# Patient Record
Sex: FEMALE | Race: BLACK OR AFRICAN AMERICAN | NOT HISPANIC OR LATINO | Employment: FULL TIME | URBAN - METROPOLITAN AREA
[De-identification: names, ages, dates, MRNs, and addresses within clinical notes are randomized per-mention and may not be internally consistent; named-entity substitution may affect disease eponyms.]

---

## 2019-07-12 PROBLEM — F32.A DEPRESSION: Status: ACTIVE | Noted: 2019-07-12

## 2019-07-12 PROBLEM — E66.01 MORBID OBESITY: Status: ACTIVE | Noted: 2019-07-12

## 2019-07-12 PROBLEM — E03.9 HYPOTHYROIDISM: Status: ACTIVE | Noted: 2019-07-12

## 2019-07-12 PROBLEM — E55.9 VITAMIN D DEFICIENCY: Status: ACTIVE | Noted: 2019-07-12

## 2019-12-03 PROBLEM — R44.0 AUDITORY HALLUCINATION: Status: ACTIVE | Noted: 2019-12-03

## 2019-12-03 PROBLEM — J06.9 UPPER RESPIRATORY INFECTION: Status: ACTIVE | Noted: 2019-12-03

## 2020-01-29 PROBLEM — R44.0 AUDITORY HALLUCINATION: Status: RESOLVED | Noted: 2019-12-03 | Resolved: 2020-01-29

## 2020-01-29 PROBLEM — F33.3 SEVERE EPISODE OF RECURRENT MAJOR DEPRESSIVE DISORDER, WITH PSYCHOTIC FEATURES: Status: ACTIVE | Noted: 2019-07-12

## 2021-03-08 PROBLEM — M79.2 NEUROPATHIC PAIN: Status: ACTIVE | Noted: 2018-03-15

## 2021-03-08 PROBLEM — F10.129 ALCOHOL ABUSE WITH INTOXICATION: Status: ACTIVE | Noted: 2021-03-08

## 2021-10-06 PROBLEM — R45.851 SUICIDAL IDEATION: Status: ACTIVE | Noted: 2021-10-06

## 2021-10-06 PROBLEM — U07.1 ASYMPTOMATIC COVID-19 VIRUS INFECTION: Status: ACTIVE | Noted: 2021-10-06

## 2021-10-06 PROBLEM — E66.9 OBESITY, CLASS II, BMI 35-39.9: Status: ACTIVE | Noted: 2021-10-06

## 2021-10-06 PROBLEM — F33.2 SEVERE EPISODE OF RECURRENT MAJOR DEPRESSIVE DISORDER, WITHOUT PSYCHOTIC FEATURES: Status: ACTIVE | Noted: 2019-07-12

## 2021-10-06 PROBLEM — E66.812 OBESITY, CLASS II, BMI 35-39.9: Status: ACTIVE | Noted: 2021-10-06

## 2022-07-02 PROBLEM — F41.9 ANXIETY: Status: ACTIVE | Noted: 2022-07-02

## 2023-06-30 PROBLEM — H04.123 DRY EYE SYNDROME, BILATERAL: Status: ACTIVE | Noted: 2023-06-30

## 2023-06-30 PROBLEM — H02.886 MEIBOMIAN GLAND DYSFUNCTION (MGD) OF BOTH EYES: Status: ACTIVE | Noted: 2023-06-30

## 2023-06-30 PROBLEM — H43.393 VITREOUS FLOATERS OF BOTH EYES: Status: ACTIVE | Noted: 2023-06-30

## 2023-06-30 PROBLEM — E11.9 DIABETES MELLITUS WITHOUT COMPLICATION: Status: ACTIVE | Noted: 2023-06-30

## 2023-06-30 PROBLEM — H40.013 OAG (OPEN ANGLE GLAUCOMA) SUSPECT, LOW RISK, BILATERAL: Status: ACTIVE | Noted: 2023-06-30

## 2023-06-30 PROBLEM — H02.883 MEIBOMIAN GLAND DYSFUNCTION (MGD) OF BOTH EYES: Status: ACTIVE | Noted: 2023-06-30

## 2023-06-30 PROBLEM — H25.813 COMBINED FORMS OF AGE-RELATED CATARACT OF BOTH EYES: Status: ACTIVE | Noted: 2023-06-30

## 2023-06-30 PROBLEM — H53.10 SUBJECTIVE VISUAL DISTURBANCE OF BOTH EYES: Status: ACTIVE | Noted: 2023-06-30

## 2023-08-25 PROBLEM — R44.0 AUDITORY HALLUCINATION: Status: ACTIVE | Noted: 2018-03-15

## 2024-05-01 PROBLEM — U07.1 ASYMPTOMATIC COVID-19 VIRUS INFECTION: Status: RESOLVED | Noted: 2021-10-06 | Resolved: 2024-05-01

## 2024-05-01 PROBLEM — J06.9 UPPER RESPIRATORY INFECTION: Status: RESOLVED | Noted: 2019-12-03 | Resolved: 2024-05-01

## 2024-05-01 LAB — PAP RECOMMENDATION EXT: NORMAL

## 2024-06-07 ENCOUNTER — PATIENT OUTREACH (OUTPATIENT)
Dept: ADMINISTRATIVE | Facility: HOSPITAL | Age: 44
End: 2024-06-07

## 2024-06-25 PROBLEM — H40.023 OAG (OPEN ANGLE GLAUCOMA) SUSPECT, HIGH RISK, BILATERAL: Status: ACTIVE | Noted: 2024-06-25

## 2024-07-02 PROBLEM — Z90.79 H/O UNILATERAL SALPINGECTOMY: Status: ACTIVE | Noted: 2024-07-02

## 2024-10-10 ENCOUNTER — ON-DEMAND VIRTUAL (OUTPATIENT)
Dept: URGENT CARE | Facility: CLINIC | Age: 44
End: 2024-10-10

## 2024-10-10 DIAGNOSIS — R45.851 SUICIDAL IDEATION: Primary | ICD-10-CM

## 2024-10-10 NOTE — PROGRESS NOTES
Subjective:      Patient ID: Verona Eastman is a 44 y.o. female.    Vitals:  vitals were not taken for this visit.     Chief Complaint: Depression and Anxiety      Visit Type: TELE AUDIOVISUAL    Present with the patient at the time of consultation: TELEMED PRESENT WITH PATIENT: None, at home    Past Medical History:   Diagnosis Date    Anxiety     Auditory hallucination 12/03/2019    Combined forms of age-related cataract of both eyes 06/30/2023    Depression     Diabetes mellitus without complication 06/30/2023    HTN (hypertension)     Hypertension     Hypothyroid     Hypothyroidism     Otalgia     Thyroid cancer     Tinnitus, unspecified ear     Withdrawal symptoms, alcohol      Past Surgical History:   Procedure Laterality Date    DIAGNOSTIC LAPAROSCOPY Right 7/2/2024    Procedure: LAPAROSCOPY, DIAGNOSTIC WITH RIGHT SALPINGECTOMY;  Surgeon: Geno Carbone MD;  Location: [facility] MAIN OR;  Service: OB/GYN;  Laterality: Right;    FINGER SURGERY      THYROID SURGERY       Review of patient's allergies indicates:   Allergen Reactions    Dye Hives, Itching and Rash     Patient is allergic to pink/peach dye of Levothyroxine 200mcg tablets.      No current facility-administered medications on file prior to visit.     Current Outpatient Medications on File Prior to Visit   Medication Sig Dispense Refill    albuterol (PROVENTIL/VENTOLIN HFA) 90 mcg/actuation inhaler Inhale 2 puffs into the lungs every 6 (six) hours as needed (chest congestion). Rescue 18 g 1    amLODIPine (NORVASC) 10 MG tablet Take 1 tablet (10 mg total) by mouth once daily. 90 tablet 3    amLODIPine (NORVASC) 10 MG tablet Take 1 tablet (10 mg total) by mouth once daily. 90 tablet 0    budesonide-formoterol 160-4.5 mcg (SYMBICORT) 160-4.5 mcg/actuation HFAA Inhale 2 puffs into the lungs every 12 (twelve) hours. Controller 10.2 g 6    budesonide-formoterol 160-4.5 mcg (SYMBICORT) 160-4.5 mcg/actuation HFAA Inhale 2 puffs into the lungs every 12  (twelve) hours. Controller 10.2 g 6    fluticasone propionate (FLONASE) 50 mcg/actuation nasal spray 2 sprays (100 mcg total) by Each Nostril route once daily. 16 g 0    HYDROcodone-acetaminophen (NORCO) 5-325 mg per tablet Take 1 tablet by mouth every 6 (six) hours as needed for Pain. 10 tablet 0    hydrocortisone 2.5 % cream Apply topically 2 (two) times daily. 20 g 0    hydrOXYzine HCL (ATARAX) 25 MG tablet Take 1 tablet (25 mg total) by mouth 3 (three) times daily as needed for Itching. 30 tablet 0    ibuprofen (ADVIL,MOTRIN) 600 MG tablet Take 1 tablet (600 mg total) by mouth every 6 (six) hours as needed for Pain. 30 tablet 0    levothyroxine (SYNTHROID) 100 MCG tablet Take 1 tablet (100 mcg total) by mouth before breakfast. 90 tablet 1    levothyroxine (SYNTHROID) 100 MCG tablet Take 1 tablet (100 mcg total) by mouth before breakfast. Take 100 mcg by mouth. 30 tablet 2    levothyroxine (SYNTHROID) 100 MCG tablet Take 1 tablet (100 mcg total) by mouth before breakfast. 90 tablet 0    meloxicam (MOBIC) 15 MG tablet Take 1 tablet (15 mg total) by mouth once daily. 30 tablet 1     Family History   Problem Relation Name Age of Onset    Glaucoma Mother      Cataracts Mother      Cancer Mother      Cataracts Father      Diabetes Father      Hypertension Father      Gout Father      No Known Problems Maternal Grandmother      No Known Problems Maternal Grandfather      No Known Problems Paternal Grandmother      No Known Problems Paternal Grandfather      No Known Problems Daughter      No Known Problems Maternal Uncle      No Known Problems Paternal Aunt      No Known Problems Paternal Uncle      Breast cancer Cousin          early 20's (maternal)    Cancer Cousin      No Known Problems Other      Ovarian cancer Neg Hx      Colon cancer Neg Hx      Amblyopia Neg Hx      Blindness Neg Hx      Macular degeneration Neg Hx      Retinal detachment Neg Hx      Strabismus Neg Hx      Endometrial cancer Neg Hx       "Pancreatic cancer Neg Hx             [facility] Peq Odvv Intake    10/10/2024 11:35 AM CDT - Filed by Patient   What is your current physical address in the event of a medical emergency? [address]   Are you able to take your vital signs? Yes   Systolic Blood Pressure:    Diastolic Blood Pressure:    Weight:    Height:    Pulse:    Temperature:    Respiration rate:    Pulse Oxygen:    Please attach any relevant images or files    Is your employer contracted with [employer]? No         Patient calls with suicidal ideations. States, "I'm suicidal and scared." I believe she said she was alone in the house. Tearful and incoherent on the call. Unable to get any further history. Asked if there was someone I could call. No answer given. She ended the call abruptly. Attempted to call both emergency contacts listed on file with no answer. [place] police department called for assistance at 11:52. Stated they were sending an officer to the address on file and would call back for follow-up.    DepressionPatient presents with the following symptoms: suicidal ideas.      Anxiety  Symptoms include suicidal ideas.           Psychiatric/Behavioral:  Positive for suicidal ideas, history of mental illness and depression.         Objective:   The physical exam was conducted virtually.  Physical Exam   Constitutional: She is uncooperative.   Psychiatric: Her affect is tearful. Her speech is rapid and/or pressured. She expresses suicidal ideation. Plan of suicide: unknown if she had a plan.       Assessment:     1. Suicidal ideation        Plan:   Call ended abruptly by patient. No further history or information was able to be obtained. Unable to get a hold of family listed on file. [place] [employer] was notified and information given to them for assistance and follow-up on patient's well being. Dr. Jolly notified and aware of this encounter.    Suicidal ideation                     "

## 2024-10-11 PROBLEM — J45.909 UNCOMPLICATED ASTHMA: Status: ACTIVE | Noted: 2024-10-11

## 2024-10-16 PROBLEM — R45.851 SUICIDAL IDEATIONS: Status: ACTIVE | Noted: 2024-10-16

## 2024-11-18 ENCOUNTER — PATIENT MESSAGE (OUTPATIENT)
Dept: ADMINISTRATIVE | Facility: HOSPITAL | Age: 44
End: 2024-11-18
Payer: MEDICAID

## 2024-12-10 PROBLEM — H52.31 ANISOMETROPIA: Status: ACTIVE | Noted: 2024-12-10

## 2025-04-24 ENCOUNTER — PATIENT OUTREACH (OUTPATIENT)
Dept: ADMINISTRATIVE | Facility: HOSPITAL | Age: 45
End: 2025-04-24
Payer: MEDICAID